# Patient Record
Sex: FEMALE | Race: WHITE | NOT HISPANIC OR LATINO | URBAN - METROPOLITAN AREA
[De-identification: names, ages, dates, MRNs, and addresses within clinical notes are randomized per-mention and may not be internally consistent; named-entity substitution may affect disease eponyms.]

---

## 2023-07-20 ENCOUNTER — OFFICE (OUTPATIENT)
Dept: URBAN - METROPOLITAN AREA CLINIC 13 | Facility: CLINIC | Age: 83
End: 2023-07-20

## 2023-07-20 VITALS — SYSTOLIC BLOOD PRESSURE: 142 MMHG | WEIGHT: 177 LBS | HEIGHT: 60 IN | DIASTOLIC BLOOD PRESSURE: 80 MMHG

## 2023-07-20 DIAGNOSIS — R10.32 LEFT LOWER QUADRANT PAIN: ICD-10-CM

## 2023-07-20 DIAGNOSIS — K59.09 OTHER CONSTIPATION: ICD-10-CM

## 2023-07-20 DIAGNOSIS — E66.9 OBESITY, UNSPECIFIED: ICD-10-CM

## 2023-07-20 LAB
CBC, PLATELET CT  AND  DIFF: ABS BASOPHIL: 0 K/UL
CBC, PLATELET CT  AND  DIFF: ABS EOSINOPHIL: 0.1 K/UL
CBC, PLATELET CT  AND  DIFF: ABS IMMATURE GRANS: 0 K/UL
CBC, PLATELET CT  AND  DIFF: ABS LYMPHOCYTE: 1.7 K/UL
CBC, PLATELET CT  AND  DIFF: ABS MONOCYTE: 0.7 K/UL
CBC, PLATELET CT  AND  DIFF: ABS NEUTROPHIL: 4 K/UL
CBC, PLATELET CT  AND  DIFF: BASOPHIL: 0.6 %
CBC, PLATELET CT  AND  DIFF: DIFFERENTIAL: (no result)
CBC, PLATELET CT  AND  DIFF: EOSINOPHIL: 1.4 %
CBC, PLATELET CT  AND  DIFF: HEMATOCRIT: 40.2 %
CBC, PLATELET CT  AND  DIFF: HEMOGLOBIN: 14 G/DL
CBC, PLATELET CT  AND  DIFF: IMMATURE GRANULOCYTES: 0.2 %
CBC, PLATELET CT  AND  DIFF: LYMPHOCYTE: 26.5 %
CBC, PLATELET CT  AND  DIFF: MCH: 31.3 PG
CBC, PLATELET CT  AND  DIFF: MCHC: 34.8 G/DL
CBC, PLATELET CT  AND  DIFF: MCV: 89.7 FL
CBC, PLATELET CT  AND  DIFF: MONOCYTE: 10.1 %
CBC, PLATELET CT  AND  DIFF: MPV: 10.5 FL
CBC, PLATELET CT  AND  DIFF: NEUTROPHIL: 61.2 %
CBC, PLATELET CT  AND  DIFF: NRBCS: 0 /100 WBC
CBC, PLATELET CT  AND  DIFF: PLATELET COUNT: 236 K/UL
CBC, PLATELET CT  AND  DIFF: RBC: 4.48 M/UL
CBC, PLATELET CT  AND  DIFF: RDW: 12.5 %
CBC, PLATELET CT  AND  DIFF: WBC COUNT: 6.5 K/UL
COMPREHENSIVE METABOLIC PANEL: A/G RATIO: 2.1 RATIO
COMPREHENSIVE METABOLIC PANEL: ALBUMIN: 4.2 G/DL
COMPREHENSIVE METABOLIC PANEL: ALK PHOSPHATASE: 72 U/L
COMPREHENSIVE METABOLIC PANEL: ALT: 21 U/L
COMPREHENSIVE METABOLIC PANEL: AST: 19 U/L
COMPREHENSIVE METABOLIC PANEL: BILIRUBIN,TOTAL: 0.5 MG/DL
COMPREHENSIVE METABOLIC PANEL: BLOOD UREA NITROGEN: 12 MG/DL
COMPREHENSIVE METABOLIC PANEL: BUN/CREAT RATIO: 20
COMPREHENSIVE METABOLIC PANEL: CALCIUM: 9.6 MG/DL
COMPREHENSIVE METABOLIC PANEL: CHLORIDE: 90 MEQ/L — LOW
COMPREHENSIVE METABOLIC PANEL: CO2: 28 MEQ/L
COMPREHENSIVE METABOLIC PANEL: CREATININE: 0.6 MG/DL
COMPREHENSIVE METABOLIC PANEL: FASTING STATUS: (no result)
COMPREHENSIVE METABOLIC PANEL: GLOBULIN: 2 G/DL
COMPREHENSIVE METABOLIC PANEL: GLOMERULAR FILTRATION RATE (GFR): 90 MLS/MIN/1.73M2
COMPREHENSIVE METABOLIC PANEL: GLUCOSE,RANDOM: 145 MG/DL — HIGH
COMPREHENSIVE METABOLIC PANEL: POTASSIUM: 4.4 MEQ/L
COMPREHENSIVE METABOLIC PANEL: SODIUM: 130 MEQ/L — LOW
COMPREHENSIVE METABOLIC PANEL: TOTAL PROTEIN: 6.2 G/DL

## 2023-07-20 PROCEDURE — 99204 OFFICE O/P NEW MOD 45 MIN: CPT | Performed by: INTERNAL MEDICINE

## 2023-08-01 ENCOUNTER — AMBULATORY SURGICAL CENTER (OUTPATIENT)
Dept: URBAN - METROPOLITAN AREA SURGERY 4 | Facility: SURGERY | Age: 83
End: 2023-08-01
Payer: MEDICARE

## 2023-08-01 ENCOUNTER — OFFICE (OUTPATIENT)
Dept: URBAN - METROPOLITAN AREA PATHOLOGY 1 | Facility: PATHOLOGY | Age: 83
End: 2023-08-01
Payer: MEDICARE

## 2023-08-01 VITALS
HEIGHT: 60 IN | HEART RATE: 69 BPM | DIASTOLIC BLOOD PRESSURE: 90 MMHG | HEART RATE: 79 BPM | DIASTOLIC BLOOD PRESSURE: 92 MMHG | DIASTOLIC BLOOD PRESSURE: 70 MMHG | TEMPERATURE: 97.6 F | HEIGHT: 60 IN | SYSTOLIC BLOOD PRESSURE: 133 MMHG | HEART RATE: 86 BPM | SYSTOLIC BLOOD PRESSURE: 133 MMHG | SYSTOLIC BLOOD PRESSURE: 154 MMHG | SYSTOLIC BLOOD PRESSURE: 154 MMHG | DIASTOLIC BLOOD PRESSURE: 101 MMHG | DIASTOLIC BLOOD PRESSURE: 73 MMHG | HEART RATE: 72 BPM | DIASTOLIC BLOOD PRESSURE: 79 MMHG | WEIGHT: 177 LBS | HEART RATE: 75 BPM | OXYGEN SATURATION: 100 % | SYSTOLIC BLOOD PRESSURE: 131 MMHG | SYSTOLIC BLOOD PRESSURE: 131 MMHG | DIASTOLIC BLOOD PRESSURE: 79 MMHG | OXYGEN SATURATION: 97 % | HEART RATE: 85 BPM | HEART RATE: 86 BPM | RESPIRATION RATE: 6 BRPM | DIASTOLIC BLOOD PRESSURE: 101 MMHG | HEART RATE: 72 BPM | RESPIRATION RATE: 6 BRPM | OXYGEN SATURATION: 99 % | DIASTOLIC BLOOD PRESSURE: 78 MMHG | SYSTOLIC BLOOD PRESSURE: 151 MMHG | RESPIRATION RATE: 15 BRPM | RESPIRATION RATE: 15 BRPM | DIASTOLIC BLOOD PRESSURE: 90 MMHG | RESPIRATION RATE: 16 BRPM | TEMPERATURE: 97.6 F | SYSTOLIC BLOOD PRESSURE: 151 MMHG | OXYGEN SATURATION: 99 % | DIASTOLIC BLOOD PRESSURE: 78 MMHG | HEART RATE: 69 BPM | OXYGEN SATURATION: 96 % | DIASTOLIC BLOOD PRESSURE: 75 MMHG | SYSTOLIC BLOOD PRESSURE: 182 MMHG | HEART RATE: 85 BPM | HEART RATE: 71 BPM | HEART RATE: 75 BPM | SYSTOLIC BLOOD PRESSURE: 182 MMHG | DIASTOLIC BLOOD PRESSURE: 70 MMHG | WEIGHT: 177 LBS | DIASTOLIC BLOOD PRESSURE: 75 MMHG | SYSTOLIC BLOOD PRESSURE: 141 MMHG | SYSTOLIC BLOOD PRESSURE: 141 MMHG | DIASTOLIC BLOOD PRESSURE: 92 MMHG | HEART RATE: 79 BPM | DIASTOLIC BLOOD PRESSURE: 73 MMHG | SYSTOLIC BLOOD PRESSURE: 155 MMHG | OXYGEN SATURATION: 97 % | HEART RATE: 71 BPM | SYSTOLIC BLOOD PRESSURE: 155 MMHG | RESPIRATION RATE: 16 BRPM | OXYGEN SATURATION: 100 % | OXYGEN SATURATION: 96 %

## 2023-08-01 DIAGNOSIS — K57.30 DIVERTICULOSIS OF LARGE INTESTINE WITHOUT PERFORATION OR ABS: ICD-10-CM

## 2023-08-01 DIAGNOSIS — K59.00 CONSTIPATION, UNSPECIFIED: ICD-10-CM

## 2023-08-01 DIAGNOSIS — R10.32 LEFT LOWER QUADRANT PAIN: ICD-10-CM

## 2023-08-01 DIAGNOSIS — D12.6 BENIGN NEOPLASM OF COLON, UNSPECIFIED: ICD-10-CM

## 2023-08-01 DIAGNOSIS — K59.09 OTHER CONSTIPATION: ICD-10-CM

## 2023-08-01 DIAGNOSIS — K63.5 POLYP OF COLON: ICD-10-CM

## 2023-08-01 PROCEDURE — 88305 TISSUE EXAM BY PATHOLOGIST: CPT | Performed by: PATHOLOGY

## 2023-08-01 PROCEDURE — 45385 COLONOSCOPY W/LESION REMOVAL: CPT | Performed by: INTERNAL MEDICINE

## 2023-08-01 NOTE — SERVICEHPINOTES
Patient is undergoing colonoscopy for evaluation of left lower quadrant abdominal pain and constipation.

## 2023-08-04 LAB
PDF: PDF REPORT: (no result)
PDF: PDF REPORT: (no result)